# Patient Record
Sex: FEMALE | Race: WHITE | NOT HISPANIC OR LATINO | Employment: UNEMPLOYED | URBAN - METROPOLITAN AREA
[De-identification: names, ages, dates, MRNs, and addresses within clinical notes are randomized per-mention and may not be internally consistent; named-entity substitution may affect disease eponyms.]

---

## 2024-05-14 ENCOUNTER — LAB (OUTPATIENT)
Dept: LAB | Facility: LAB | Age: 29
End: 2024-05-14
Payer: COMMERCIAL

## 2024-05-14 ENCOUNTER — OFFICE VISIT (OUTPATIENT)
Dept: PRIMARY CARE | Facility: CLINIC | Age: 29
End: 2024-05-14
Payer: COMMERCIAL

## 2024-05-14 ENCOUNTER — HOSPITAL ENCOUNTER (OUTPATIENT)
Dept: RADIOLOGY | Facility: HOSPITAL | Age: 29
Discharge: HOME | End: 2024-05-14
Payer: COMMERCIAL

## 2024-05-14 ENCOUNTER — HOSPITAL ENCOUNTER (OUTPATIENT)
Dept: RADIOLOGY | Facility: CLINIC | Age: 29
Discharge: HOME | End: 2024-05-14
Payer: COMMERCIAL

## 2024-05-14 VITALS
WEIGHT: 168 LBS | OXYGEN SATURATION: 97 % | BODY MASS INDEX: 24.88 KG/M2 | DIASTOLIC BLOOD PRESSURE: 71 MMHG | TEMPERATURE: 96.8 F | SYSTOLIC BLOOD PRESSURE: 108 MMHG | HEART RATE: 77 BPM | HEIGHT: 69 IN

## 2024-05-14 DIAGNOSIS — R10.9 ABDOMINAL PAIN, UNSPECIFIED ABDOMINAL LOCATION: ICD-10-CM

## 2024-05-14 DIAGNOSIS — K59.00 CONSTIPATION, UNSPECIFIED CONSTIPATION TYPE: ICD-10-CM

## 2024-05-14 DIAGNOSIS — Z00.00 WELL ADULT EXAM: ICD-10-CM

## 2024-05-14 DIAGNOSIS — Z00.00 WELL ADULT EXAM: Primary | ICD-10-CM

## 2024-05-14 LAB
25(OH)D3 SERPL-MCNC: 26 NG/ML (ref 30–100)
ALBUMIN SERPL BCP-MCNC: 4.7 G/DL (ref 3.4–5)
ALP SERPL-CCNC: 53 U/L (ref 33–110)
ALT SERPL W P-5'-P-CCNC: 13 U/L (ref 7–45)
ANION GAP SERPL CALC-SCNC: 10 MMOL/L (ref 10–20)
AST SERPL W P-5'-P-CCNC: 18 U/L (ref 9–39)
BASOPHILS # BLD AUTO: 0.03 X10*3/UL (ref 0–0.1)
BASOPHILS NFR BLD AUTO: 0.7 %
BILIRUB SERPL-MCNC: 0.5 MG/DL (ref 0–1.2)
BUN SERPL-MCNC: 11 MG/DL (ref 6–23)
CALCIUM SERPL-MCNC: 9.1 MG/DL (ref 8.6–10.3)
CHLORIDE SERPL-SCNC: 102 MMOL/L (ref 98–107)
CHOLEST SERPL-MCNC: 193 MG/DL (ref 0–199)
CHOLESTEROL/HDL RATIO: 2.5
CO2 SERPL-SCNC: 33 MMOL/L (ref 21–32)
CREAT SERPL-MCNC: 0.64 MG/DL (ref 0.5–1.05)
EGFRCR SERPLBLD CKD-EPI 2021: >90 ML/MIN/1.73M*2
EOSINOPHIL # BLD AUTO: 0.13 X10*3/UL (ref 0–0.7)
EOSINOPHIL NFR BLD AUTO: 3.1 %
ERYTHROCYTE [DISTWIDTH] IN BLOOD BY AUTOMATED COUNT: 12.6 % (ref 11.5–14.5)
EST. AVERAGE GLUCOSE BLD GHB EST-MCNC: 100 MG/DL
GLIADIN PEPTIDE IGA SER IA-ACNC: <1 U/ML
GLUCOSE SERPL-MCNC: 95 MG/DL (ref 74–99)
HBA1C MFR BLD: 5.1 %
HCT VFR BLD AUTO: 44.1 % (ref 36–46)
HDLC SERPL-MCNC: 77 MG/DL
HGB BLD-MCNC: 14.4 G/DL (ref 12–16)
IMM GRANULOCYTES # BLD AUTO: 0.01 X10*3/UL (ref 0–0.7)
IMM GRANULOCYTES NFR BLD AUTO: 0.2 % (ref 0–0.9)
LDLC SERPL CALC-MCNC: 106 MG/DL
LYMPHOCYTES # BLD AUTO: 1.3 X10*3/UL (ref 1.2–4.8)
LYMPHOCYTES NFR BLD AUTO: 30.5 %
MCH RBC QN AUTO: 30.4 PG (ref 26–34)
MCHC RBC AUTO-ENTMCNC: 32.7 G/DL (ref 32–36)
MCV RBC AUTO: 93 FL (ref 80–100)
MONOCYTES # BLD AUTO: 0.27 X10*3/UL (ref 0.1–1)
MONOCYTES NFR BLD AUTO: 6.3 %
NEUTROPHILS # BLD AUTO: 2.52 X10*3/UL (ref 1.2–7.7)
NEUTROPHILS NFR BLD AUTO: 59.2 %
NON HDL CHOLESTEROL: 116 MG/DL (ref 0–149)
NRBC BLD-RTO: 0 /100 WBCS (ref 0–0)
PLATELET # BLD AUTO: 265 X10*3/UL (ref 150–450)
POTASSIUM SERPL-SCNC: 4.5 MMOL/L (ref 3.5–5.3)
PROT SERPL-MCNC: 7 G/DL (ref 6.4–8.2)
RBC # BLD AUTO: 4.74 X10*6/UL (ref 4–5.2)
SODIUM SERPL-SCNC: 140 MMOL/L (ref 136–145)
TRIGL SERPL-MCNC: 50 MG/DL (ref 0–149)
TSH SERPL-ACNC: 1.08 MIU/L (ref 0.44–3.98)
TTG IGA SER IA-ACNC: <1 U/ML
VIT B12 SERPL-MCNC: 164 PG/ML (ref 211–911)
VLDL: 10 MG/DL (ref 0–40)
WBC # BLD AUTO: 4.3 X10*3/UL (ref 4.4–11.3)

## 2024-05-14 PROCEDURE — 82306 VITAMIN D 25 HYDROXY: CPT

## 2024-05-14 PROCEDURE — 83516 IMMUNOASSAY NONANTIBODY: CPT

## 2024-05-14 PROCEDURE — 80061 LIPID PANEL: CPT

## 2024-05-14 PROCEDURE — 99385 PREV VISIT NEW AGE 18-39: CPT | Performed by: INTERNAL MEDICINE

## 2024-05-14 PROCEDURE — 76705 ECHO EXAM OF ABDOMEN: CPT

## 2024-05-14 PROCEDURE — 85025 COMPLETE CBC W/AUTO DIFF WBC: CPT

## 2024-05-14 PROCEDURE — 1036F TOBACCO NON-USER: CPT | Performed by: INTERNAL MEDICINE

## 2024-05-14 PROCEDURE — 74018 RADEX ABDOMEN 1 VIEW: CPT

## 2024-05-14 PROCEDURE — 76705 ECHO EXAM OF ABDOMEN: CPT | Performed by: RADIOLOGY

## 2024-05-14 PROCEDURE — 36415 COLL VENOUS BLD VENIPUNCTURE: CPT

## 2024-05-14 PROCEDURE — 84443 ASSAY THYROID STIM HORMONE: CPT

## 2024-05-14 PROCEDURE — 82607 VITAMIN B-12: CPT

## 2024-05-14 PROCEDURE — 80053 COMPREHEN METABOLIC PANEL: CPT

## 2024-05-14 PROCEDURE — 74018 RADEX ABDOMEN 1 VIEW: CPT | Performed by: RADIOLOGY

## 2024-05-14 PROCEDURE — 83036 HEMOGLOBIN GLYCOSYLATED A1C: CPT

## 2024-05-14 ASSESSMENT — PATIENT HEALTH QUESTIONNAIRE - PHQ9
2. FEELING DOWN, DEPRESSED OR HOPELESS: NOT AT ALL
SUM OF ALL RESPONSES TO PHQ9 QUESTIONS 1 AND 2: 0
1. LITTLE INTEREST OR PLEASURE IN DOING THINGS: NOT AT ALL

## 2024-05-14 NOTE — PROGRESS NOTES
"Subjective       Current Issues:  Current concerns include r uq pain  Cramping  Few weeks  Wt up   2 years ago lost 40 # wt going up  No fast foods  Salads   Ho constipation    No   bladder issues  Ho constipation   No cp or sob or depression  Sees gyn dr mars Borrero is nurse w dr crews  Review of Nutrition:  Current diet: overall good    No depression  Sleep ok     Gen:  no fever  HEENT:  no trouble swallowing  CV:  no dyspnea, cyanosis  Lungs:  no shortness of breath  GI:    no blood in stool  Vascular:  no edema  Neuro:   no weakness  Skin:  no rash  MS:no joint swelling  Gu:  no urinary complaints  All other systems have been reviewed and are negative for complaint      Screening Questions:  Objective   /71   Pulse 77   Temp 36 °C (96.8 °F) (Temporal)   Ht 1.753 m (5' 9\")   Wt 76.2 kg (168 lb)   SpO2 97%   BMI 24.81 kg/m²       General:   alert and oriented, in no acute distress   Gait:   normal   Skin:   normal   Oral cavity:   lips, mucosa, and tongue normal; teeth and gums normal   Eyes:   sclerae white, pupils equal and reactive   Ears:   normal bilaterally Tms grey   Neck:   no adenopathy and thyroid not enlarged, symmetric, no tenderness/mass/nodules   Lungs:  clear to auscultation bilaterally   Heart:   regular rate and rhythm, S1, S2 normal, no murmur, click, rub or gallop   Abdomen:  soft, non-tender; bowel sounds normal; no masses, no organomegaly   : Ne        Extremities:  extremities normal, warm and well-perfused; no cyanosis, clubbing, or edema,   Neuro:  normal without focal findings and muscle tone and strength normal and symmetric       Jodi was seen today for annual exam.  Diagnoses and all orders for this visit:  Well adult exam (Primary)  -     Hemoglobin A1C; Future  -     Comprehensive Metabolic Panel; Future  -     TSH with reflex to Free T4 if abnormal; Future  -     Vitamin D 25-Hydroxy,Total (for eval of Vitamin D levels); Future  -     Vitamin B12; Future  -     Lipid " Panel; Future  -     CBC and Auto Differential; Future  -     XR abdomen 1 view; Future  -     US right upper quadrant; Future  -     Celiac Panel; Future  Constipation, unspecified constipation type  Comments:  start miralax, has used before  once doing better, wean miralax  Orders:  -     Hemoglobin A1C; Future  -     Comprehensive Metabolic Panel; Future  -     TSH with reflex to Free T4 if abnormal; Future  -     Vitamin D 25-Hydroxy,Total (for eval of Vitamin D levels); Future  -     Vitamin B12; Future  -     Lipid Panel; Future  -     CBC and Auto Differential; Future  -     XR abdomen 1 view; Future  -     US right upper quadrant; Future  -     Celiac Panel; Future  Abdominal pain, unspecified abdominal location  Comments:  ro gallbladder issues  fu if sx not resolved discussed w pt  Orders:  -     Hemoglobin A1C; Future  -     Comprehensive Metabolic Panel; Future  -     TSH with reflex to Free T4 if abnormal; Future  -     Vitamin D 25-Hydroxy,Total (for eval of Vitamin D levels); Future  -     Vitamin B12; Future  -     Lipid Panel; Future  -     CBC and Auto Differential; Future  -     XR abdomen 1 view; Future  -     US right upper quadrant; Future  -     Celiac Panel; Future          Fu in one year for well care and as otherwise stated in wrap up plans.

## 2024-05-15 DIAGNOSIS — E55.9 VITAMIN D DEFICIENCY: ICD-10-CM

## 2024-05-15 DIAGNOSIS — E53.8 B12 DEFICIENCY: ICD-10-CM

## 2024-05-15 DIAGNOSIS — R10.9 ABDOMINAL PAIN, UNSPECIFIED ABDOMINAL LOCATION: ICD-10-CM

## 2024-05-15 RX ORDER — ERGOCALCIFEROL 1.25 MG/1
50000 CAPSULE ORAL
Qty: 6 CAPSULE | Refills: 0 | Status: SHIPPED | OUTPATIENT
Start: 2024-05-19 | End: 2024-06-30

## 2024-05-16 LAB
GLIADIN PEPTIDE IGG SER IA-ACNC: <0.56 FLU (ref 0–4.99)
TTG IGG SER IA-ACNC: <0.82 FLU (ref 0–4.99)

## 2024-05-17 ENCOUNTER — CLINICAL SUPPORT (OUTPATIENT)
Dept: PRIMARY CARE | Facility: CLINIC | Age: 29
End: 2024-05-17
Payer: COMMERCIAL

## 2024-05-17 DIAGNOSIS — E53.8 VITAMIN B 12 DEFICIENCY: ICD-10-CM

## 2024-05-17 PROCEDURE — 96372 THER/PROPH/DIAG INJ SC/IM: CPT | Performed by: NURSE PRACTITIONER

## 2024-05-17 RX ORDER — CYANOCOBALAMIN 1000 UG/ML
1000 INJECTION, SOLUTION INTRAMUSCULAR; SUBCUTANEOUS ONCE
Status: COMPLETED | OUTPATIENT
Start: 2024-05-17 | End: 2024-05-17

## 2024-05-17 RX ADMIN — CYANOCOBALAMIN 1000 MCG: 1000 INJECTION, SOLUTION INTRAMUSCULAR; SUBCUTANEOUS at 15:54

## 2024-05-30 ENCOUNTER — HOSPITAL ENCOUNTER (OUTPATIENT)
Dept: RADIOLOGY | Facility: HOSPITAL | Age: 29
Discharge: HOME | End: 2024-05-30
Payer: COMMERCIAL

## 2024-05-30 DIAGNOSIS — R10.9 ABDOMINAL PAIN, UNSPECIFIED ABDOMINAL LOCATION: ICD-10-CM

## 2024-05-30 PROCEDURE — 2550000001 HC RX 255 CONTRASTS: Performed by: INTERNAL MEDICINE

## 2024-05-30 PROCEDURE — 74177 CT ABD & PELVIS W/CONTRAST: CPT

## 2024-05-30 PROCEDURE — 74177 CT ABD & PELVIS W/CONTRAST: CPT | Performed by: RADIOLOGY

## 2024-05-30 RX ADMIN — IOHEXOL 75 ML: 350 INJECTION, SOLUTION INTRAVENOUS at 10:47

## 2024-06-03 ENCOUNTER — TELEPHONE (OUTPATIENT)
Dept: PRIMARY CARE | Facility: CLINIC | Age: 29
End: 2024-06-03
Payer: COMMERCIAL

## 2024-06-03 DIAGNOSIS — Q45.3: Primary | ICD-10-CM

## 2024-06-05 ENCOUNTER — TELEPHONE (OUTPATIENT)
Dept: PRIMARY CARE | Facility: CLINIC | Age: 29
End: 2024-06-05
Payer: COMMERCIAL

## 2024-06-05 NOTE — TELEPHONE ENCOUNTER
Patient called asking if she should still schedule with Gastro. She scheduled a appt with Surgical Oncology and has a appt on 6/10. Please advise

## 2024-06-05 NOTE — TELEPHONE ENCOUNTER
"For some reason when the appt was scheduled it did not use the gastro referral it is scheduled under the \"Surgical Oncology\". I will relay to patient to keep appt  "

## 2024-06-10 ENCOUNTER — TELEMEDICINE (OUTPATIENT)
Dept: SURGICAL ONCOLOGY | Facility: CLINIC | Age: 29
End: 2024-06-10
Payer: COMMERCIAL

## 2024-06-10 ENCOUNTER — LAB (OUTPATIENT)
Dept: LAB | Facility: LAB | Age: 29
End: 2024-06-10
Payer: COMMERCIAL

## 2024-06-10 DIAGNOSIS — R93.3 ABNORMAL FINDINGS ON DIAGNOSTIC IMAGING OF DIGESTIVE SYSTEM: Primary | ICD-10-CM

## 2024-06-10 DIAGNOSIS — Q45.3: ICD-10-CM

## 2024-06-10 DIAGNOSIS — R10.11 RIGHT UPPER QUADRANT ABDOMINAL PAIN: ICD-10-CM

## 2024-06-10 LAB
AMYLASE SERPL-CCNC: 42 U/L (ref 29–103)
LIPASE SERPL-CCNC: 12 U/L (ref 9–82)

## 2024-06-10 PROCEDURE — 99204 OFFICE O/P NEW MOD 45 MIN: CPT | Performed by: PHYSICIAN ASSISTANT

## 2024-06-10 PROCEDURE — 83690 ASSAY OF LIPASE: CPT

## 2024-06-10 PROCEDURE — 82787 IGG 1 2 3 OR 4 EACH: CPT

## 2024-06-10 PROCEDURE — 82150 ASSAY OF AMYLASE: CPT

## 2024-06-10 PROCEDURE — 36415 COLL VENOUS BLD VENIPUNCTURE: CPT

## 2024-06-10 NOTE — PROGRESS NOTES
"A virtual visit (audio and visual connection) between the patient (at the originating site) and the provider (at the distant site) was utilized to provide this telehealth service.    Verbal consent was requested and obtained from the patient immediately prior to the telehealth visit.     Subjective   Ms. Nguyen is a 28-year-old female who is referred by Dr. Alie Garrison for abnormal pancreas imaging.    She recently saw Dr. Garrison for complaint of RUQ pain. She describes this pain as a cramping sensation that improves with massage and settles into a \"soreness.\" There is no radiation and no associated symptoms such as nausea, vomiting, fever or chills.     For work-up of this pain, she had a RUQ ultrasound that demonstrated a hepatic hemangioma and heterogeneous appearance of the pancreas. She then had a CT A/P with contrast on 24 that showed a sausage-like appearance of the pancreas suggestive of autoimmune pancreatitis vs an anatomical variant.     She was referred here for further work-up.    She denies a personal history of acute pancreatitis.     She has experienced epigastric pain for years that she describes as sharp. This lasts for seconds and only occurs after eating eggs.    Her weight is good and her appetite is stable. She does have a history of constipation for which she uses Miralax PRN. She denies rectal bleeding.    Medical and surgical history: Endometriosis s/p laparoscopic bilateral salpingectomy and excision of endometriosis.   Family history: No pancreatitis or pancreatic cancer. Maternal grandmother and maternal grandfather both had colon cancer; grandfather  at age 63. Maternal great aunt had ovarian cancer.  Social history: Non-smoker. Occasional alcohol. No illicits.       Objective     LMP 2024 Comment: Tubal     Physical Exam  A physical exam was not conducted as this was a phone or virtual visit. The patient is in no acute distress.     Assessment/Plan   Ms. Nguyen is a " 28-year-old female who is referred by Dr. Alie Garrison for abnormal pancreas imaging.    PLAN: She had a recent CT for right upper quadrant pain that showed a featureless, sausage-like pancreas. This could represent autoimmune pancreatitis or could be an anatomic variant.     I have ordered and scheduled a MRI/MRCP for further evaluation. I also recommend an IgG4 level which has already been ordered by her primary care doctor. I will look out for the results.       Nivia Mary PA-C

## 2024-06-11 LAB — IGG4 SER-MCNC: 24 MG/DL (ref 3–200)

## 2024-06-26 ENCOUNTER — APPOINTMENT (OUTPATIENT)
Dept: RADIOLOGY | Facility: HOSPITAL | Age: 29
End: 2024-06-26
Payer: COMMERCIAL

## 2024-06-26 PROBLEM — N80.9 ENDOMETRIOSIS: Status: ACTIVE | Noted: 2024-06-26

## 2024-06-26 PROBLEM — R53.81 MALAISE AND FATIGUE: Status: ACTIVE | Noted: 2024-06-26

## 2024-06-26 PROBLEM — F41.1 GENERALIZED ANXIETY DISORDER: Status: ACTIVE | Noted: 2024-06-26

## 2024-06-26 PROBLEM — R10.2 PAIN IN PELVIS: Status: ACTIVE | Noted: 2024-06-26

## 2024-06-26 PROBLEM — R20.2 PARESTHESIA: Status: ACTIVE | Noted: 2024-06-26

## 2024-06-26 PROBLEM — R87.610 ASCUS OF CERVIX WITH NEGATIVE HIGH RISK HPV: Status: ACTIVE | Noted: 2024-06-26

## 2024-06-26 PROBLEM — N94.10 DYSPAREUNIA IN FEMALE: Status: ACTIVE | Noted: 2024-06-26

## 2024-06-26 PROBLEM — G62.9 NEUROPATHY: Status: ACTIVE | Noted: 2024-06-26

## 2024-06-26 PROBLEM — R53.83 LETHARGY: Status: ACTIVE | Noted: 2024-06-26

## 2024-06-26 PROBLEM — N90.89 IRRITATION OF VULVA: Status: ACTIVE | Noted: 2024-06-26

## 2024-06-26 PROBLEM — B96.89 BACTERIAL VAGINOSIS: Status: ACTIVE | Noted: 2024-06-26

## 2024-06-26 PROBLEM — R51.9 HEADACHE: Status: ACTIVE | Noted: 2024-06-26

## 2024-06-26 PROBLEM — R53.83 MALAISE AND FATIGUE: Status: ACTIVE | Noted: 2024-06-26

## 2024-06-26 PROBLEM — K04.7 DENTAL ABSCESS: Status: ACTIVE | Noted: 2024-02-09

## 2024-06-26 PROBLEM — Z97.5 PRESENCE OF INTRAUTERINE CONTRACEPTIVE DEVICE: Status: ACTIVE | Noted: 2024-06-26

## 2024-06-26 PROBLEM — G50.0 LEFT-SIDED TRIGEMINAL NEURALGIA: Status: ACTIVE | Noted: 2024-02-09

## 2024-06-26 PROBLEM — N89.8 VAGINAL DISCHARGE: Status: ACTIVE | Noted: 2024-06-26

## 2024-06-26 PROBLEM — N76.0 BACTERIAL VAGINOSIS: Status: ACTIVE | Noted: 2024-06-26

## 2024-06-30 ENCOUNTER — APPOINTMENT (OUTPATIENT)
Dept: RADIOLOGY | Facility: HOSPITAL | Age: 29
End: 2024-06-30
Payer: COMMERCIAL

## 2024-07-03 ENCOUNTER — TELEPHONE (OUTPATIENT)
Dept: PRIMARY CARE | Facility: CLINIC | Age: 29
End: 2024-07-03
Payer: COMMERCIAL

## 2024-07-03 NOTE — TELEPHONE ENCOUNTER
Patient called stating that she got her MRI done last week and has not heard anything from this office with her results. Patient stated that she went to [facility]. I let patient know we might not have received the results if she has not received a call from the office. Faxed over to [facility] in [facility] requesting results of [phone]

## 2024-07-05 NOTE — TELEPHONE ENCOUNTER
Attempted to call [facility], no answer and they have no mailbox for me to leave VM. Faxed over another attempt to receive results

## 2024-07-05 NOTE — TELEPHONE ENCOUNTER
Patient called robi that she still has not got a call from our office with MRI results. I let patient know we faxed over a request for results on Wednesday 7/3 to [facility] and I dont have anything scanned into her chart. Do you know if we have received results yet?   Patient also stated she called [facility] to have them fax over the results and was informed that they were faxing them over that day.  Please advise

## 2024-08-16 ENCOUNTER — APPOINTMENT (OUTPATIENT)
Dept: PRIMARY CARE | Facility: CLINIC | Age: 29
End: 2024-08-16
Payer: COMMERCIAL

## 2024-08-20 ENCOUNTER — LAB (OUTPATIENT)
Dept: LAB | Facility: LAB | Age: 29
End: 2024-08-20
Payer: COMMERCIAL

## 2024-08-20 ENCOUNTER — APPOINTMENT (OUTPATIENT)
Dept: PRIMARY CARE | Facility: CLINIC | Age: 29
End: 2024-08-20
Payer: COMMERCIAL

## 2024-08-20 VITALS
TEMPERATURE: 98.4 F | WEIGHT: 176.6 LBS | SYSTOLIC BLOOD PRESSURE: 117 MMHG | DIASTOLIC BLOOD PRESSURE: 81 MMHG | HEART RATE: 74 BPM | BODY MASS INDEX: 26.08 KG/M2 | OXYGEN SATURATION: 97 %

## 2024-08-20 DIAGNOSIS — E53.8 B12 DEFICIENCY: ICD-10-CM

## 2024-08-20 DIAGNOSIS — E53.8 B12 DEFICIENCY: Primary | ICD-10-CM

## 2024-08-20 DIAGNOSIS — Q45.3 PANCREATIC ABNORMALITY: ICD-10-CM

## 2024-08-20 DIAGNOSIS — E55.9 VITAMIN D DEFICIENCY: ICD-10-CM

## 2024-08-20 PROBLEM — N89.8 VAGINAL DISCHARGE: Status: RESOLVED | Noted: 2024-06-26 | Resolved: 2024-08-20

## 2024-08-20 LAB
25(OH)D3 SERPL-MCNC: 32 NG/ML (ref 30–100)
FOLATE SERPL-MCNC: 9.9 NG/ML
HCYS SERPL-SCNC: 11.38 UMOL/L (ref 5–13.9)
VIT B12 SERPL-MCNC: 153 PG/ML (ref 211–911)

## 2024-08-20 PROCEDURE — 82607 VITAMIN B-12: CPT

## 2024-08-20 PROCEDURE — 99213 OFFICE O/P EST LOW 20 MIN: CPT | Performed by: NURSE PRACTITIONER

## 2024-08-20 PROCEDURE — 83090 ASSAY OF HOMOCYSTEINE: CPT

## 2024-08-20 PROCEDURE — 82306 VITAMIN D 25 HYDROXY: CPT

## 2024-08-20 PROCEDURE — 36415 COLL VENOUS BLD VENIPUNCTURE: CPT

## 2024-08-20 PROCEDURE — 83921 ORGANIC ACID SINGLE QUANT: CPT

## 2024-08-20 PROCEDURE — 82746 ASSAY OF FOLIC ACID SERUM: CPT

## 2024-08-20 PROCEDURE — 96372 THER/PROPH/DIAG INJ SC/IM: CPT | Performed by: NURSE PRACTITIONER

## 2024-08-20 RX ORDER — CYANOCOBALAMIN 1000 UG/ML
1000 INJECTION, SOLUTION INTRAMUSCULAR; SUBCUTANEOUS ONCE
Status: COMPLETED | OUTPATIENT
Start: 2024-08-20 | End: 2024-08-20

## 2024-08-20 ASSESSMENT — PATIENT HEALTH QUESTIONNAIRE - PHQ9
SUM OF ALL RESPONSES TO PHQ9 QUESTIONS 1 AND 2: 0
1. LITTLE INTEREST OR PLEASURE IN DOING THINGS: NOT AT ALL
2. FEELING DOWN, DEPRESSED OR HOPELESS: NOT AT ALL

## 2024-08-20 NOTE — PATIENT INSTRUCTIONS
Your Vitamin D level is low- add 2000 IU daily with a meal.  Vitamin D deficiency has been implicated in heart disease, chronic pain, Fibromyalgia, hypertension, arthritis, depression, inflammatory bowel disease, obesity, Crohns Disease, several cancers, Multiple Sclerosis and other autoimmune diseases. Vitamin D deficiency can also cause symptoms of fatigue and myalgias (muscle aches).  Goal Vitamin D level is ~50. Vitamin D3 is available over-the-counter in 1,000 IU, 2,000 IU, and 5,000 IU tablets.    Repeat vitamin d in 2-3 mo after consistent use of supplement    Blood work today  Come back up for b12 shot  Then begin b12 oral supplement daily thereafter    Repeat b12 and vit d in 3 mo

## 2024-08-20 NOTE — ASSESSMENT & PLAN NOTE
Cause?  May have autoimmune pancreatitis  Lab work ordered, then will give b12 shot  Then to start oral B12  Repeat in 3 mo

## 2024-08-20 NOTE — ASSESSMENT & PLAN NOTE
May have autoimmune pancreatitis, external MRI scans sent to [facility] for review  Cause of her b12 deficiency?

## 2024-08-20 NOTE — PROGRESS NOTES
Subjective   Patient ID: Jodi Nguyen is a 28 y.o. female who presents for Follow-up.    No questions or concerns  Took a few high dose vitamin D  Not taking daily supplement  Not taking b12  Not a vegan or strict vegetarian  No gastric surgery  Eating and drinking well  No change in appetite  No change in bowel or bladder  No sweats/fevers/chills  No SOB or wheezing  No CP or palpitations          Review of Systems  ROS completely negative except what was mentioned in the HPI.  Problem List, surgical, social, and family histories which were reviewed and updated as necessary within the EMR. I also personally reviewed the notes, labs, and imaging that pertained to what was documented or discussed in the HPI.    Objective   Physical Exam  Vitals and nursing note reviewed.   Constitutional:       General: She is not in acute distress.     Appearance: Normal appearance.   HENT:      Head: Normocephalic and atraumatic.      Right Ear: External ear normal.      Left Ear: External ear normal.      Nose: Nose normal.      Mouth/Throat:      Mouth: Mucous membranes are moist.   Eyes:      Extraocular Movements: Extraocular movements intact.      Conjunctiva/sclera: Conjunctivae normal.      Pupils: Pupils are equal, round, and reactive to light.   Cardiovascular:      Rate and Rhythm: Normal rate and regular rhythm.      Heart sounds: Normal heart sounds.   Pulmonary:      Effort: Pulmonary effort is normal.      Breath sounds: Normal breath sounds.   Musculoskeletal:         General: Normal range of motion.      Cervical back: Normal range of motion and neck supple.   Skin:     General: Skin is warm and dry.   Neurological:      General: No focal deficit present.      Mental Status: She is alert and oriented to person, place, and time. Mental status is at baseline.   Psychiatric:         Mood and Affect: Mood normal.         Behavior: Behavior normal.         Thought Content: Thought content normal.         Judgment:  Judgment normal.       /81   Pulse 74   Temp 36.9 °C (98.4 °F) (Temporal)   Wt 80.1 kg (176 lb 9.6 oz)   SpO2 97%   BMI 26.08 kg/m²     Assessment/Plan    Problem List Items Addressed This Visit       B12 deficiency - Primary    Current Assessment & Plan     Cause?  May have autoimmune pancreatitis  Lab work ordered, then will give b12 shot  Then to start oral B12  Repeat in 3 mo           Relevant Medications    cyanocobalamin (Vitamin B-12) injection 1,000 mcg (Completed)    Other Relevant Orders    Folate    Methylmalonic acid, serum    Homocysteine, serum    Vitamin B12    Pancreatic abnormality    Current Assessment & Plan     May have autoimmune pancreatitis, external MRI scans sent to [facility] for review  Cause of her b12 deficiency?         Vitamin D deficiency    Overview     5/24: 26  Goal ~50         Current Assessment & Plan     Did not take full 6 weeks of high dose vitamin d, nor start daily supplement  Will have her begin 2000IU daily, repeat lab in 3 mo

## 2024-08-20 NOTE — ASSESSMENT & PLAN NOTE
Did not take full 6 weeks of high dose vitamin d, nor start daily supplement  Will have her begin 2000IU daily, repeat lab in 3 mo

## 2024-08-21 DIAGNOSIS — E53.8 VITAMIN B 12 DEFICIENCY: ICD-10-CM

## 2024-08-26 LAB — METHYLMALONATE SERPL-SCNC: 0.14 UMOL/L (ref 0–0.4)

## 2024-09-06 ENCOUNTER — CLINICAL SUPPORT (OUTPATIENT)
Dept: PRIMARY CARE | Facility: CLINIC | Age: 29
End: 2024-09-06
Payer: COMMERCIAL

## 2024-09-06 DIAGNOSIS — E53.8 B12 DEFICIENCY: ICD-10-CM

## 2024-09-06 PROCEDURE — 96372 THER/PROPH/DIAG INJ SC/IM: CPT | Performed by: INTERNAL MEDICINE

## 2024-09-06 RX ORDER — CYANOCOBALAMIN 1000 UG/ML
1000 INJECTION, SOLUTION INTRAMUSCULAR; SUBCUTANEOUS
Status: SHIPPED | OUTPATIENT
Start: 2024-09-06 | End: 2024-10-04

## 2024-09-12 ENCOUNTER — CLINICAL SUPPORT (OUTPATIENT)
Dept: PRIMARY CARE | Facility: CLINIC | Age: 29
End: 2024-09-12
Payer: COMMERCIAL

## 2024-09-12 PROCEDURE — 96372 THER/PROPH/DIAG INJ SC/IM: CPT | Performed by: INTERNAL MEDICINE

## 2024-09-19 ENCOUNTER — APPOINTMENT (OUTPATIENT)
Dept: PRIMARY CARE | Facility: CLINIC | Age: 29
End: 2024-09-19
Payer: COMMERCIAL

## 2024-09-19 PROCEDURE — 96372 THER/PROPH/DIAG INJ SC/IM: CPT | Performed by: INTERNAL MEDICINE

## 2024-09-26 ENCOUNTER — APPOINTMENT (OUTPATIENT)
Dept: PRIMARY CARE | Facility: CLINIC | Age: 29
End: 2024-09-26
Payer: COMMERCIAL

## 2024-09-26 ENCOUNTER — CLINICAL SUPPORT (OUTPATIENT)
Dept: PRIMARY CARE | Facility: CLINIC | Age: 29
End: 2024-09-26
Payer: COMMERCIAL

## 2024-09-26 PROCEDURE — 96372 THER/PROPH/DIAG INJ SC/IM: CPT | Performed by: INTERNAL MEDICINE

## 2024-09-27 ENCOUNTER — APPOINTMENT (OUTPATIENT)
Dept: PRIMARY CARE | Facility: CLINIC | Age: 29
End: 2024-09-27
Payer: COMMERCIAL

## 2024-10-18 ENCOUNTER — OFFICE VISIT (OUTPATIENT)
Dept: PRIMARY CARE | Facility: CLINIC | Age: 29
End: 2024-10-18
Payer: COMMERCIAL

## 2024-10-18 VITALS
TEMPERATURE: 98.4 F | DIASTOLIC BLOOD PRESSURE: 77 MMHG | BODY MASS INDEX: 25.13 KG/M2 | HEART RATE: 86 BPM | SYSTOLIC BLOOD PRESSURE: 113 MMHG | WEIGHT: 170.2 LBS | OXYGEN SATURATION: 97 %

## 2024-10-18 DIAGNOSIS — J06.9 UPPER RESPIRATORY TRACT INFECTION, UNSPECIFIED TYPE: Primary | ICD-10-CM

## 2024-10-18 LAB — POC RAPID STREP: NEGATIVE

## 2024-10-18 PROCEDURE — 87880 STREP A ASSAY W/OPTIC: CPT | Performed by: NURSE PRACTITIONER

## 2024-10-18 PROCEDURE — 99213 OFFICE O/P EST LOW 20 MIN: CPT | Performed by: NURSE PRACTITIONER

## 2024-10-18 PROCEDURE — 1036F TOBACCO NON-USER: CPT | Performed by: NURSE PRACTITIONER

## 2024-10-18 RX ORDER — FLUTICASONE PROPIONATE 50 MCG
1 SPRAY, SUSPENSION (ML) NASAL 2 TIMES DAILY
Qty: 16 G | Refills: 5 | Status: SHIPPED | OUTPATIENT
Start: 2024-10-18 | End: 2025-10-18

## 2024-10-18 RX ORDER — AMOXICILLIN AND CLAVULANATE POTASSIUM 875; 125 MG/1; MG/1
875 TABLET, FILM COATED ORAL 2 TIMES DAILY
Qty: 14 TABLET | Refills: 0 | Status: SHIPPED | OUTPATIENT
Start: 2024-10-18 | End: 2024-10-25

## 2024-10-18 RX ORDER — GUAIFENESIN 600 MG/1
600 TABLET, EXTENDED RELEASE ORAL 2 TIMES DAILY
Qty: 60 TABLET | Refills: 0 | Status: SHIPPED | OUTPATIENT
Start: 2024-10-18 | End: 2024-11-17

## 2024-10-18 NOTE — PROGRESS NOTES
"Problem List Items Addressed This Visit    None  Visit Diagnoses       Upper respiratory tract infection, unspecified type    -  Primary    strep neg  if doesn't start to improve with symptomatic care ok to start augmentin  prn fu IO    Relevant Medications    fluticasone (Flonase) 50 mcg/actuation nasal spray    guaiFENesin (Mucinex) 600 mg 12 hr tablet    amoxicillin-pot clavulanate (Augmentin) 875-125 mg tablet    Other Relevant Orders    POCT Rapid Strep A manually resulted (Completed)             Subjective   Patient ID: Jodi Nguyen is a 29 y.o. female who presents for Sick Visit (Sick for one week cough, sore throat, flem in throat./Tylenol PM for 2 days got migraine stopped taking/Drinking lots of fluids).  HPI  No fever  No sob or wheeze  No rash  No v/d  Nasal congestion   No nasal spray      Review of Systems   All other systems reviewed and are negative.      BP Readings from Last 3 Encounters:   10/18/24 113/77   08/20/24 117/81   05/14/24 108/71      Wt Readings from Last 3 Encounters:   10/18/24 77.2 kg (170 lb 3.2 oz)   08/20/24 80.1 kg (176 lb 9.6 oz)   05/14/24 76.2 kg (168 lb)      BMI:   Estimated body mass index is 25.13 kg/m² as calculated from the following:    Height as of 5/14/24: 1.753 m (5' 9\").    Weight as of this encounter: 77.2 kg (170 lb 3.2 oz).    Objective   Physical Exam  Constitutional:       General: She is not in acute distress.  HENT:      Head: Normocephalic and atraumatic.      Right Ear: Tympanic membrane and external ear normal.      Left Ear: Tympanic membrane and external ear normal.      Nose: Nose normal.      Mouth/Throat:      Mouth: Mucous membranes are moist.      Pharynx: Posterior oropharyngeal erythema present. No oropharyngeal exudate.   Eyes:      Extraocular Movements: Extraocular movements intact.      Conjunctiva/sclera: Conjunctivae normal.   Cardiovascular:      Rate and Rhythm: Normal rate and regular rhythm.      Pulses: Normal pulses.   Pulmonary:      " Effort: Pulmonary effort is normal.      Breath sounds: Normal breath sounds.   Musculoskeletal:         General: Normal range of motion.      Cervical back: Normal range of motion and neck supple.   Skin:     General: Skin is warm and dry.   Neurological:      General: No focal deficit present.      Mental Status: She is alert.   Psychiatric:         Mood and Affect: Mood normal.

## 2024-10-30 ENCOUNTER — TELEPHONE (OUTPATIENT)
Dept: PRIMARY CARE | Facility: CLINIC | Age: 29
End: 2024-10-30
Payer: COMMERCIAL

## 2024-10-30 DIAGNOSIS — E53.8 B12 DEFICIENCY: ICD-10-CM

## 2024-10-31 ENCOUNTER — LAB (OUTPATIENT)
Dept: LAB | Facility: LAB | Age: 29
End: 2024-10-31
Payer: COMMERCIAL

## 2024-10-31 DIAGNOSIS — E53.8 VITAMIN B 12 DEFICIENCY: ICD-10-CM

## 2024-10-31 LAB — VIT B12 SERPL-MCNC: 309 PG/ML (ref 211–911)

## 2024-10-31 PROCEDURE — 36415 COLL VENOUS BLD VENIPUNCTURE: CPT

## 2024-10-31 PROCEDURE — 82607 VITAMIN B-12: CPT

## 2024-11-21 ENCOUNTER — TELEPHONE (OUTPATIENT)
Dept: PRIMARY CARE | Facility: CLINIC | Age: 29
End: 2024-11-21
Payer: COMMERCIAL

## 2024-11-26 ENCOUNTER — APPOINTMENT (OUTPATIENT)
Dept: PRIMARY CARE | Facility: CLINIC | Age: 29
End: 2024-11-26
Payer: COMMERCIAL

## 2024-11-26 VITALS
OXYGEN SATURATION: 96 % | WEIGHT: 170 LBS | TEMPERATURE: 97 F | BODY MASS INDEX: 25.18 KG/M2 | HEART RATE: 83 BPM | DIASTOLIC BLOOD PRESSURE: 80 MMHG | SYSTOLIC BLOOD PRESSURE: 116 MMHG | HEIGHT: 69 IN

## 2024-11-26 DIAGNOSIS — F41.1 GENERALIZED ANXIETY DISORDER: Primary | ICD-10-CM

## 2024-11-26 DIAGNOSIS — F41.0 PANIC DISORDER: ICD-10-CM

## 2024-11-26 PROCEDURE — 3008F BODY MASS INDEX DOCD: CPT | Performed by: NURSE PRACTITIONER

## 2024-11-26 PROCEDURE — 93000 ELECTROCARDIOGRAM COMPLETE: CPT | Performed by: NURSE PRACTITIONER

## 2024-11-26 PROCEDURE — 1036F TOBACCO NON-USER: CPT | Performed by: NURSE PRACTITIONER

## 2024-11-26 PROCEDURE — 99214 OFFICE O/P EST MOD 30 MIN: CPT | Performed by: NURSE PRACTITIONER

## 2024-11-26 RX ORDER — HYDROXYZINE HYDROCHLORIDE 25 MG/1
25 TABLET, FILM COATED ORAL 3 TIMES DAILY PRN
Qty: 90 TABLET | Refills: 0 | Status: SHIPPED | OUTPATIENT
Start: 2024-11-26 | End: 2024-12-26

## 2024-11-26 RX ORDER — SERTRALINE HYDROCHLORIDE 25 MG/1
25 TABLET, FILM COATED ORAL DAILY
Qty: 90 TABLET | Refills: 3 | Status: SHIPPED | OUTPATIENT
Start: 2024-11-26 | End: 2025-11-26

## 2024-11-26 ASSESSMENT — ANXIETY QUESTIONNAIRES
5. BEING SO RESTLESS THAT IT IS HARD TO SIT STILL: MORE THAN HALF THE DAYS
GAD7 TOTAL SCORE: 14
3. WORRYING TOO MUCH ABOUT DIFFERENT THINGS: MORE THAN HALF THE DAYS
6. BECOMING EASILY ANNOYED OR IRRITABLE: NEARLY EVERY DAY
1. FEELING NERVOUS, ANXIOUS, OR ON EDGE: MORE THAN HALF THE DAYS
7. FEELING AFRAID AS IF SOMETHING AWFUL MIGHT HAPPEN: NOT AT ALL
4. TROUBLE RELAXING: NEARLY EVERY DAY
IF YOU CHECKED OFF ANY PROBLEMS ON THIS QUESTIONNAIRE, HOW DIFFICULT HAVE THESE PROBLEMS MADE IT FOR YOU TO DO YOUR WORK, TAKE CARE OF THINGS AT HOME, OR GET ALONG WITH OTHER PEOPLE: VERY DIFFICULT
2. NOT BEING ABLE TO STOP OR CONTROL WORRYING: MORE THAN HALF THE DAYS

## 2024-11-26 ASSESSMENT — PATIENT HEALTH QUESTIONNAIRE - PHQ9
2. FEELING DOWN, DEPRESSED OR HOPELESS: NOT AT ALL
1. LITTLE INTEREST OR PLEASURE IN DOING THINGS: NOT AT ALL
SUM OF ALL RESPONSES TO PHQ9 QUESTIONS 1 AND 2: 0

## 2024-11-26 NOTE — PROGRESS NOTES
Subjective   Patient ID: Jodi Nguyen is a 29 y.o. female who presents for Anxiety (Anxiety - panic attacks /Happened a few years ago lasted half a year- now has been experiencing a few months ).    Hx of anxiety  In the past, had really bad anxiety  Sitting  Will get palpitations  Then right pinky toe  Would cause her to have a panic attack  Quit drinking caffeine 2 mo ago  Drank half a cup of coffee and causes tingling  Any change in body sensation causes her to panic  In past had one medication caused dark thought, paranoia  Then was on wellbutrin, did not help  No current SI/SH  No near syncope  No CP        Review of Systems  ROS completely negative except what was mentioned in the HPI.  Problem List, surgical, social, and family histories which were reviewed and updated as necessary within the EMR. I also personally reviewed the notes, labs, and imaging that pertained to what was documented or discussed in the HPI.    Objective   Physical Exam  Vitals and nursing note reviewed.   Constitutional:       General: She is not in acute distress.     Appearance: Normal appearance.   HENT:      Head: Normocephalic and atraumatic.      Right Ear: External ear normal.      Left Ear: External ear normal.      Nose: Nose normal.      Mouth/Throat:      Mouth: Mucous membranes are moist.   Eyes:      Extraocular Movements: Extraocular movements intact.      Conjunctiva/sclera: Conjunctivae normal.      Pupils: Pupils are equal, round, and reactive to light.   Cardiovascular:      Rate and Rhythm: Normal rate and regular rhythm.      Heart sounds: Normal heart sounds.   Pulmonary:      Effort: Pulmonary effort is normal.      Breath sounds: Normal breath sounds.   Musculoskeletal:         General: Normal range of motion.      Cervical back: Normal range of motion and neck supple.   Lymphadenopathy:      Cervical: No cervical adenopathy.   Skin:     General: Skin is warm and dry.   Neurological:      General: No focal  "deficit present.      Mental Status: She is alert and oriented to person, place, and time. Mental status is at baseline.   Psychiatric:         Mood and Affect: Mood normal.         Behavior: Behavior normal.         Thought Content: Thought content normal.         Judgment: Judgment normal.         /80   Pulse 83   Temp 36.1 °C (97 °F) (Temporal)   Ht 1.753 m (5' 9\")   Wt 77.1 kg (170 lb)   SpO2 96%   BMI 25.10 kg/m²     Assessment/Plan    Problem List Items Addressed This Visit       Generalized anxiety disorder - Primary    Overview     Wellbutrin ineffective  Buspirone caused paranoia  11/26/24 GENE = 14, zoloft started         Current Assessment & Plan     Discussed at length various medication  Will try Zoloft, discussed all risks and benefits  Hydroxyzine prn  FU in 4-6 weeks  ECG NSR today  Reviewed recent labwork         Relevant Medications    sertraline (Zoloft) 25 mg tablet    hydrOXYzine HCL (Atarax) 25 mg tablet    Panic disorder    Relevant Medications    sertraline (Zoloft) 25 mg tablet    hydrOXYzine HCL (Atarax) 25 mg tablet    Other Relevant Orders    ECG 12 lead (Clinic Performed) (Completed)      "

## 2024-11-27 PROBLEM — N76.0 BACTERIAL VAGINOSIS: Status: RESOLVED | Noted: 2024-06-26 | Resolved: 2024-11-27

## 2024-11-27 PROBLEM — B96.89 BACTERIAL VAGINOSIS: Status: RESOLVED | Noted: 2024-06-26 | Resolved: 2024-11-27

## 2024-11-27 PROBLEM — K04.7 DENTAL ABSCESS: Status: RESOLVED | Noted: 2024-02-09 | Resolved: 2024-11-27

## 2024-11-27 PROBLEM — N90.89 IRRITATION OF VULVA: Status: RESOLVED | Noted: 2024-06-26 | Resolved: 2024-11-27

## 2024-11-27 PROBLEM — R53.83 MALAISE AND FATIGUE: Status: RESOLVED | Noted: 2024-06-26 | Resolved: 2024-11-27

## 2024-11-27 PROBLEM — R53.83 LETHARGY: Status: RESOLVED | Noted: 2024-06-26 | Resolved: 2024-11-27

## 2024-11-27 PROBLEM — R53.81 MALAISE AND FATIGUE: Status: RESOLVED | Noted: 2024-06-26 | Resolved: 2024-11-27

## 2024-11-27 PROBLEM — Z97.5 PRESENCE OF INTRAUTERINE CONTRACEPTIVE DEVICE: Status: RESOLVED | Noted: 2024-06-26 | Resolved: 2024-11-27

## 2024-11-27 PROBLEM — R10.2 PAIN IN PELVIS: Status: RESOLVED | Noted: 2024-06-26 | Resolved: 2024-11-27

## 2024-11-27 PROBLEM — F41.0 PANIC DISORDER: Status: ACTIVE | Noted: 2024-11-27

## 2024-11-27 NOTE — ASSESSMENT & PLAN NOTE
Discussed at length various medication  Will try Zoloft, discussed all risks and benefits  Hydroxyzine prn  FU in 4-6 weeks  ECG NSR today  Reviewed recent labwork

## 2025-01-30 LAB — VIT B12 SERPL-MCNC: 340 PG/ML (ref 200–1100)

## 2025-02-04 ENCOUNTER — TELEPHONE (OUTPATIENT)
Dept: PRIMARY CARE | Facility: CLINIC | Age: 30
End: 2025-02-04
Payer: COMMERCIAL

## 2025-02-04 NOTE — TELEPHONE ENCOUNTER
Called patient states she has been taking zoloft since the beginning of December for anxiety it has been working good up until a week ago does not feel like it is making a difference   Increase ? Np appt ?  Please advise

## 2025-02-14 ENCOUNTER — APPOINTMENT (OUTPATIENT)
Dept: PRIMARY CARE | Facility: CLINIC | Age: 30
End: 2025-02-14
Payer: COMMERCIAL

## 2025-02-14 VITALS
BODY MASS INDEX: 24.32 KG/M2 | WEIGHT: 164.2 LBS | OXYGEN SATURATION: 98 % | TEMPERATURE: 96.2 F | HEART RATE: 73 BPM | SYSTOLIC BLOOD PRESSURE: 116 MMHG | HEIGHT: 69 IN | DIASTOLIC BLOOD PRESSURE: 79 MMHG

## 2025-02-14 DIAGNOSIS — R20.2 PARESTHESIA: ICD-10-CM

## 2025-02-14 DIAGNOSIS — E53.8 B12 DEFICIENCY: ICD-10-CM

## 2025-02-14 DIAGNOSIS — F41.1 GENERALIZED ANXIETY DISORDER: Primary | ICD-10-CM

## 2025-02-14 DIAGNOSIS — F41.0 PANIC DISORDER: ICD-10-CM

## 2025-02-14 PROCEDURE — 99213 OFFICE O/P EST LOW 20 MIN: CPT | Performed by: NURSE PRACTITIONER

## 2025-02-14 PROCEDURE — 3008F BODY MASS INDEX DOCD: CPT | Performed by: NURSE PRACTITIONER

## 2025-02-14 PROCEDURE — 1036F TOBACCO NON-USER: CPT | Performed by: NURSE PRACTITIONER

## 2025-02-14 RX ORDER — LANOLIN ALCOHOL/MO/W.PET/CERES
1000 CREAM (GRAM) TOPICAL DAILY
Qty: 90 TABLET | Refills: 3
Start: 2025-02-14 | End: 2026-02-14

## 2025-02-14 RX ORDER — SERTRALINE HYDROCHLORIDE 50 MG/1
50 TABLET, FILM COATED ORAL DAILY
Qty: 90 TABLET | Refills: 0 | Status: SHIPPED | OUTPATIENT
Start: 2025-02-14

## 2025-02-14 NOTE — PROGRESS NOTES
Problem List Items Addressed This Visit          Medium    B12 deficiency    Overview     2/14/25 start 1000 mcg every day . ? Relieve R foot numbness          Relevant Medications    cyanocobalamin (Vitamin B-12) 1,000 mcg tablet    Generalized anxiety disorder - Primary    Overview     Wellbutrin ineffective  Buspirone caused paranoia  11/26/24 GENE = 14, zoloft started  2/14/25: increase zoloft from 25 mg to up to 50 mg every day. Discussed gene sight. May want to try prozac instead of zoloft          Current Assessment & Plan     30 day fu VV NP ok          Relevant Medications    sertraline (Zoloft) 50 mg tablet    Panic disorder    Overview     Buspar - paranoia  Wellbutrin - ineffective  Has atarax prn   2/14/25: increase zoloft from 25 mg to up to 50 mg every day. Discussed gene sight. May want to try prozac instead of zoloft          Relevant Medications    sertraline (Zoloft) 50 mg tablet    Paresthesia    Overview     Saw neuro 9/2022          Current Assessment & Plan     Need Emg results              Subjective   Patient ID: Jodi Nguyen is a 29 y.o. female who presents for discuss meds (discuss meds zoloft started in December  was working for her had her period a few weeks ago and now the medication does not seem to be working for her).  HPI  Panic attacks      R foot goes numb  L foot numbness resolved  Was worked up for this  This stopped with zoloft  9/2022 neuro:  PLAN:  Chart reviewed including previous/interval progress notes, messages, recent imaging, and laboratory results.  2. Discussed options with the patient and after discussion she elects to the below.  3. EMG and nerve conduction study of the right lower extremity.  4. Due to low normal vitamin B12 level (<400) asked to supplement this as via sublingual route for better absorption.   5. Immunofixation, ESR, CRP, LEESA  6. Asked to wear more supportive shoes with more arch support that spreads pressure out more evenly.   7. If not improving  "over time may consider podiatry referral.         Component      Latest Ref Rng 8/20/2024 10/31/2024 1/30/2025   Vitamin B12      211 - 911 pg/mL 153 (L)  309     Vitamin D, 25-Hydroxy, Total      30 - 100 ng/mL 32      FOLATE      >5.0 ng/mL 9.9      Methylmalonic Acid, S      0.00 - 0.40 umol/L 0.14      Homocysteine      5.00 - 13.90 umol/L 11.38      VITAMIN B12      200 - 1,100 pg/mL   340           Generalized anxiety disorder - Primary      Overview       Wellbutrin ineffective  Buspirone caused paranoia  11/26/24 GENE = 14, zoloft started           Current Assessment & Plan       Discussed at length various medication  Will try Zoloft, discussed all risks and benefits  Hydroxyzine prn  FU in 4-6 weeks  ECG NSR today  Reviewed recent labwork           Relevant Medications     sertraline (Zoloft) 25 mg tablet     hydrOXYzine HCL (Atarax) 25 mg tablet     Panic disorder     Relevant Medications     sertraline (Zoloft) 25 mg tablet     hydrOXYzine HCL (Atarax) 25 mg tablet     Other Relevant Orders     ECG 12 lead (Clinic Performed) (Completed)       Review of Systems   All other systems reviewed and are negative.      BP Readings from Last 3 Encounters:   02/14/25 116/79   11/26/24 116/80   10/18/24 113/77      Wt Readings from Last 3 Encounters:   02/14/25 74.5 kg (164 lb 3.2 oz)   11/26/24 77.1 kg (170 lb)   10/18/24 77.2 kg (170 lb 3.2 oz)      BMI:   Estimated body mass index is 24.25 kg/m² as calculated from the following:    Height as of this encounter: 1.753 m (5' 9\").    Weight as of this encounter: 74.5 kg (164 lb 3.2 oz).    Objective   Physical Exam  Constitutional:       General: She is not in acute distress.  HENT:      Head: Normocephalic and atraumatic.      Nose: Nose normal.      Mouth/Throat:      Mouth: Mucous membranes are moist.   Eyes:      Extraocular Movements: Extraocular movements intact.      Conjunctiva/sclera: Conjunctivae normal.   Neck:      Vascular: No carotid bruit. "   Cardiovascular:      Rate and Rhythm: Normal rate and regular rhythm.      Pulses: Normal pulses.   Pulmonary:      Effort: Pulmonary effort is normal.      Breath sounds: Normal breath sounds.   Musculoskeletal:         General: Normal range of motion.      Cervical back: Normal range of motion and neck supple.   Lymphadenopathy:      Cervical: No cervical adenopathy.   Skin:     General: Skin is warm and dry.   Neurological:      General: No focal deficit present.      Mental Status: She is alert.   Psychiatric:         Mood and Affect: Mood normal.

## 2025-03-14 ENCOUNTER — APPOINTMENT (OUTPATIENT)
Dept: PRIMARY CARE | Facility: CLINIC | Age: 30
End: 2025-03-14
Payer: COMMERCIAL

## 2025-03-28 ENCOUNTER — APPOINTMENT (OUTPATIENT)
Dept: PRIMARY CARE | Facility: CLINIC | Age: 30
End: 2025-03-28
Payer: COMMERCIAL

## 2025-03-28 DIAGNOSIS — F41.0 PANIC DISORDER: ICD-10-CM

## 2025-03-28 DIAGNOSIS — F41.1 GENERALIZED ANXIETY DISORDER: ICD-10-CM

## 2025-03-28 DIAGNOSIS — E53.8 B12 DEFICIENCY: Primary | ICD-10-CM

## 2025-03-28 PROCEDURE — 99213 OFFICE O/P EST LOW 20 MIN: CPT | Performed by: NURSE PRACTITIONER

## 2025-03-28 PROCEDURE — 1036F TOBACCO NON-USER: CPT | Performed by: NURSE PRACTITIONER

## 2025-03-28 ASSESSMENT — PATIENT HEALTH QUESTIONNAIRE - PHQ9
SUM OF ALL RESPONSES TO PHQ9 QUESTIONS 1 AND 2: 0
2. FEELING DOWN, DEPRESSED OR HOPELESS: NOT AT ALL
1. LITTLE INTEREST OR PLEASURE IN DOING THINGS: NOT AT ALL

## 2025-03-28 NOTE — PROGRESS NOTES
An interactive audio/visual  telecommunication system which permits real time communications between the patient (at the originating site) and provider (at the distant site) was utilized to provide this telehealth service.    Verbal consent was requested and obtained from the patient for this telehealth visit.  We have confirmed the patient wishes to see me, Solange Sims, a board certified nurse practitioner with an active [place] advanced practice license as well as verified the patient's identity and physical location in [place].    Problem List Items Addressed This Visit          Medium    B12 deficiency - Primary    Overview     2/14/25 start 1000 mcg every day . ? Relieve R foot numbness   3/28/25 encouraged again to start B12 1000 mcg every day to see if this relieves occ foot numbness         Generalized anxiety disorder    Overview     Wellbutrin ineffective  Buspirone caused paranoia  11/26/24 GENE = 14, zoloft started  2/14/25: increase zoloft from 25 mg to up to 50 mg every day. Discussed gene sight. May want to try prozac instead of zoloft   3/28/25: increase zoloft from 50 mg every day to 75 mg every day with ok to titrate to 100 mg every day. 30 day fu with NP VV ok         Panic disorder    Overview     Buspar - paranoia  Wellbutrin - ineffective  Has atarax prn   2/14/25: increase zoloft from 25 mg to up to 50 mg every day. Discussed gene sight. May want to try prozac instead of zoloft   3/28/25: increased zoloft; encouraged to try atarax prn             Subjective   Patient ID: Jodi Nguyen is a 29 y.o. female who presents for Follow-up (1 month med follow up/Upping doseage of zoloft has not helped its still the same).  HPI  Foot numbness  - occ now   B12  Hasn't started supplement    Anxiety  Panic  No change at all with increase zoloft  Hasn't tried atarax as of yet  Hasn't checked in to [facility] yet    2/14/25 my note:  Problem List Items Addressed This Visit                  Medium     B12  "deficiency     Overview       2/14/25 start 1000 mcg every day . ? Relieve R foot numbness            Relevant Medications     cyanocobalamin (Vitamin B-12) 1,000 mcg tablet     Generalized anxiety disorder - Primary     Overview       Wellbutrin ineffective  Buspirone caused paranoia  11/26/24 GENE = 14, zoloft started  2/14/25: increase zoloft from 25 mg to up to 50 mg every day. Discussed gene sight. May want to try prozac instead of zoloft            Current Assessment & Plan       30 day fu VV NP ok            Relevant Medications     sertraline (Zoloft) 50 mg tablet     Panic disorder     Overview       Buspar - paranoia  Wellbutrin - ineffective  Has atarax prn   2/14/25: increase zoloft from 25 mg to up to 50 mg every day. Discussed gene sight. May want to try prozac instead of zoloft            Relevant Medications     sertraline (Zoloft) 50 mg tablet     Paresthesia     Overview       Saw neuro 9/2022            Current Assessment & Plan       Need Emg results         Review of Systems   All other systems reviewed and are negative.      BP Readings from Last 3 Encounters:   02/14/25 116/79   11/26/24 116/80   10/18/24 113/77      Wt Readings from Last 3 Encounters:   02/14/25 74.5 kg (164 lb 3.2 oz)   11/26/24 77.1 kg (170 lb)   10/18/24 77.2 kg (170 lb 3.2 oz)      BMI:   Estimated body mass index is 24.25 kg/m² as calculated from the following:    Height as of 2/14/25: 1.753 m (5' 9\").    Weight as of 2/14/25: 74.5 kg (164 lb 3.2 oz).    Objective   Physical Exam  Gen: Alert, NAD  Respiratory:  resp effort NL, speaking in complete sentences   Neuro:  coordination intact   Mood: normal    Sitting upright         "

## 2025-04-17 ENCOUNTER — TELEPHONE (OUTPATIENT)
Dept: PRIMARY CARE | Facility: CLINIC | Age: 30
End: 2025-04-17

## 2025-04-17 ENCOUNTER — TELEMEDICINE (OUTPATIENT)
Dept: PRIMARY CARE | Facility: CLINIC | Age: 30
End: 2025-04-17
Payer: COMMERCIAL

## 2025-04-17 DIAGNOSIS — J06.9 UPPER RESPIRATORY TRACT INFECTION, UNSPECIFIED TYPE: Primary | ICD-10-CM

## 2025-04-17 PROCEDURE — 1036F TOBACCO NON-USER: CPT | Performed by: NURSE PRACTITIONER

## 2025-04-17 PROCEDURE — 99213 OFFICE O/P EST LOW 20 MIN: CPT | Performed by: NURSE PRACTITIONER

## 2025-04-17 RX ORDER — MECOBALAMIN 1000 MCG
TABLET,CHEWABLE ORAL
COMMUNITY

## 2025-04-17 RX ORDER — AMOXICILLIN AND CLAVULANATE POTASSIUM 875; 125 MG/1; MG/1
875 TABLET, FILM COATED ORAL 2 TIMES DAILY
Qty: 14 TABLET | Refills: 0 | Status: SHIPPED | OUTPATIENT
Start: 2025-04-17 | End: 2025-04-24

## 2025-04-17 ASSESSMENT — ENCOUNTER SYMPTOMS
SWOLLEN GLANDS: 0
SORE THROAT: 1
ABDOMINAL PAIN: 0
VOMITING: 0
HOARSE VOICE: 1
DIARRHEA: 0
NECK PAIN: 0
STRIDOR: 0
COUGH: 1
SHORTNESS OF BREATH: 0
TROUBLE SWALLOWING: 0

## 2025-04-17 NOTE — PROGRESS NOTES
An interactive audio/visual telecommunication system which permits real time communications between the patient (at the originating site) and provider (at the distant site) was utilized to provide this telehealth service.    Verbal consent was requested and obtained from the patient for this telehealth visit.  We have confirmed the patient wishes to see me, Solange Sims, a board certified family nurse practitioner with an active [facility] license as well as verified the patient's identity and physical location in [place].    Problem List Items Addressed This Visit    None  Visit Diagnoses         Upper respiratory tract infection, unspecified type    -  Primary    improving day 5  cont conservative symptomatic care for now  augmentin wait & see  prn fu prefer IO    Relevant Medications    amoxicillin-clavulanate (Augmentin) 875-125 mg tablet             Subjective   Patient ID: Jodi Nguyen is a 29 y.o. female who presents for Sick Visit (Every time she blows her nose mucus is bloody /Bloody green mucus /Sore throat - Monday really bad / hurt her ears- has improved/Lost voice yesterday but is getting it back /No - fever / vomiting / diarrhea / pressure in face or eyes /Sx's started Sunday - progressively got worse Monday - Tuesday /No Covid or Flu test done ).  Sore Throat   This is a new problem. The current episode started in the past 7 days. The problem has been gradually improving. Neither side of throat is experiencing more pain than the other. There has been no fever. The pain is at a severity of 1/10. The pain is mild. Associated symptoms include congestion, coughing and a hoarse voice. Pertinent negatives include no abdominal pain, diarrhea, drooling, ear discharge, ear pain, plugged ear sensation, neck pain, shortness of breath, stridor, swollen glands, trouble swallowing or vomiting.     Day 5 of sx  Nasal discharge is bloody, green  Cough sounds wet  No sob or wheeze  No rash  No n/v/d  Today she is feeling  "better    Lab Results   Component Value Date    GLUCOSE 95 05/14/2024    CALCIUM 9.1 05/14/2024     05/14/2024    K 4.5 05/14/2024    CO2 33 (H) 05/14/2024     05/14/2024    BUN 11 05/14/2024    CREATININE 0.64 05/14/2024       Review of Systems   HENT:  Positive for congestion, hoarse voice and sore throat. Negative for drooling, ear discharge, ear pain and trouble swallowing.    Respiratory:  Positive for cough. Negative for shortness of breath and stridor.    Gastrointestinal:  Negative for abdominal pain, diarrhea and vomiting.   Musculoskeletal:  Negative for neck pain.   All other systems reviewed and are negative.      BP Readings from Last 3 Encounters:   02/14/25 116/79   11/26/24 116/80   10/18/24 113/77      Wt Readings from Last 3 Encounters:   02/14/25 74.5 kg (164 lb 3.2 oz)   11/26/24 77.1 kg (170 lb)   10/18/24 77.2 kg (170 lb 3.2 oz)      BMI:   Estimated body mass index is 24.25 kg/m² as calculated from the following:    Height as of 2/14/25: 1.753 m (5' 9\").    Weight as of 2/14/25: 74.5 kg (164 lb 3.2 oz).    Objective   Physical Exam  Gen: Alert, NAD  Respiratory:  resp effort NL, speaking in complete sentences   Neuro:  coordination intact   Mood: normal    Sitting upright  Cough  Sounds congested   "

## 2025-04-17 NOTE — TELEPHONE ENCOUNTER
Patient states she started to feel sick 5 days ago  Green mucus   Bloody nose   Sore throat   No fever   Wants to do VV today with DG     Please call patient and assist with scheduling

## 2025-04-17 NOTE — PATIENT INSTRUCTIONS
Most upper respiratory tract infections (URIs) are the common cold, are caused by a virus and require no antibiotic treatment.  The misuse of antibiotics can have adverse outcomes and increase antibiotic resistance due to unnecessary use of antibiotics.  Medications for symptomatic relief include: antihistamines, oral or topical decongestants, saline nasal sprays, and analgesics to reduce fever, aches, and pain.     Therefore, would not start antibiotic until symptoms have been present WITHOUT ANY IMPROVEMENT for at least 7 days as more than likely the URI/sinusitis is viral in origin, will resolve on its own, and antibiotics won't have any effect. A typical course for a viral infection is worsening symptoms through day #5, then gradual improvement, sometimes over the next 7-10 days. In essence, it's a week getting sick and then a week getting better.    Augmentin if needed

## 2025-05-09 ENCOUNTER — APPOINTMENT (OUTPATIENT)
Dept: PRIMARY CARE | Facility: CLINIC | Age: 30
End: 2025-05-09
Payer: COMMERCIAL

## 2025-05-09 DIAGNOSIS — F41.0 PANIC DISORDER: ICD-10-CM

## 2025-05-09 DIAGNOSIS — R20.9 COLD HANDS AND FEET: ICD-10-CM

## 2025-05-09 DIAGNOSIS — G62.9 NEUROPATHY: ICD-10-CM

## 2025-05-09 DIAGNOSIS — F41.1 GENERALIZED ANXIETY DISORDER: Primary | ICD-10-CM

## 2025-05-09 PROCEDURE — 1036F TOBACCO NON-USER: CPT | Performed by: NURSE PRACTITIONER

## 2025-05-09 PROCEDURE — 99214 OFFICE O/P EST MOD 30 MIN: CPT | Performed by: NURSE PRACTITIONER

## 2025-05-09 RX ORDER — AMLODIPINE BESYLATE 2.5 MG/1
2.5 TABLET ORAL DAILY
Qty: 30 TABLET | Refills: 1 | Status: SHIPPED | OUTPATIENT
Start: 2025-05-09

## 2025-05-09 RX ORDER — SERTRALINE HYDROCHLORIDE 100 MG/1
100 TABLET, FILM COATED ORAL DAILY
Qty: 90 TABLET | Refills: 3 | Status: SHIPPED | OUTPATIENT
Start: 2025-05-09 | End: 2026-05-09

## 2025-05-09 NOTE — ASSESSMENT & PLAN NOTE
Chronic  Has seen neuro previously  Would like her to fu with neuro  Cont B12 supplement for now as it is providing some relief

## 2025-05-09 NOTE — PROGRESS NOTES
An interactive audio/visual  telecommunication system which permits real time communications between the patient (at the originating site) and provider (at the distant site) was utilized to provide this telehealth service.    Verbal consent was requested and obtained from the patient for this telehealth visit.  We have confirmed the patient wishes to see me, Solange Sims, a board certified nurse practitioner with an active [place] advanced practice license as well as verified the patient's identity and physical location in [place].      Problem List Items Addressed This Visit          Medium    Generalized anxiety disorder - Primary    Overview   Wellbutrin ineffective  Buspirone caused paranoia  11/26/24 GENE = 14, zoloft started  2/14/25: increase zoloft from 25 mg to up to 50 mg every day. Discussed gene sight. May want to try prozac instead of zoloft   3/28/25: increase zoloft from 50 mg every day to 75 mg every day with ok to titrate to 100 mg every day. 30 day fu with NP VV ok  5/9/25: sx well managed.  no AE or SE.  continue current dose zoloft 100 mg every day          Relevant Medications    sertraline (Zoloft) 100 mg tablet    Neuropathy    Current Assessment & Plan   Chronic  Has seen neuro previously  Would like her to fu with neuro  Cont B12 supplement for now as it is providing some relief          Relevant Orders    Referral to Neurology    Panic disorder    Overview   Buspar - paranoia  Wellbutrin - ineffective  Has atarax prn   2/14/25: increase zoloft from 25 mg to up to 50 mg every day. Discussed gene sight. May want to try prozac instead of zoloft   3/28/25: increased zoloft; encouraged to try atarax prn  5/9/25: doing well zoloft 100 mg qd         Current Assessment & Plan   ? Suspect body shaking is in response to ? Raynaud's symptom presence  She does have atarax prn          Relevant Medications    sertraline (Zoloft) 100 mg tablet     Other Visit Diagnoses         Cold hands and feet        ?  Raynaud's  will trial low dose CCB and have her fu with neuro given chronic numbness    Relevant Medications    amLODIPine (Norvasc) 2.5 mg tablet    Other Relevant Orders    Referral to Neurology             Subjective   Patient ID: Jodi Nguyen is a 29 y.o. female who presents for Follow-up (4-6 week follow up anxiety /Everything is going good ran out of medication yesterday needs refill).  HPI  Anxiety  Zoloft 100 mg daily  Doing well     R foot numbness  B12 is helping  When she gets really cold her toes on this foot turn white  Hands & feet are always cold  Body shakes  Gets super cold  Shivering horribly  Lasts over an hour       My 2/14/25 visit   B12 deficiency    Overview     2/14/25 start 1000 mcg every day . ? Relieve R foot numbness          Relevant Medications    cyanocobalamin (Vitamin B-12) 1,000 mcg tablet    Generalized anxiety disorder - Primary    Overview     Wellbutrin ineffective  Buspirone caused paranoia  11/26/24 GENE = 14, zoloft started  2/14/25: increase zoloft from 25 mg to up to 50 mg every day. Discussed gene sight. May want to try prozac instead of zoloft          Current Assessment & Plan     30 day fu VV NP ok          Relevant Medications    sertraline (Zoloft) 50 mg tablet    Panic disorder    Overview     Buspar - paranoia  Wellbutrin - ineffective  Has atarax prn   2/14/25: increase zoloft from 25 mg to up to 50 mg every day. Discussed gene sight. May want to try prozac instead of zoloft          Relevant Medications    sertraline (Zoloft) 50 mg tablet    Paresthesia    Overview     Saw neuro 9/2022          Current Assessment & Plan     Need Emg results           Review of Systems   All other systems reviewed and are negative.      BP Readings from Last 3 Encounters:   02/14/25 116/79   11/26/24 116/80   10/18/24 113/77      Wt Readings from Last 3 Encounters:   02/14/25 74.5 kg (164 lb 3.2 oz)   11/26/24 77.1 kg (170 lb)   10/18/24 77.2 kg (170 lb 3.2 oz)      BMI:   Estimated  "body mass index is 24.25 kg/m² as calculated from the following:    Height as of 2/14/25: 1.753 m (5' 9\").    Weight as of 2/14/25: 74.5 kg (164 lb 3.2 oz).    Objective   Physical Exam  Gen: Alert, NAD  Respiratory:  resp effort NL, speaking in complete sentences   Neuro:  coordination intact   Mood: normal    Sitting upright   "

## 2025-08-20 ENCOUNTER — APPOINTMENT (OUTPATIENT)
Dept: PRIMARY CARE | Facility: CLINIC | Age: 30
End: 2025-08-20
Payer: COMMERCIAL

## 2025-08-28 ENCOUNTER — APPOINTMENT (OUTPATIENT)
Dept: PRIMARY CARE | Facility: CLINIC | Age: 30
End: 2025-08-28
Payer: COMMERCIAL

## 2025-08-28 VITALS
HEIGHT: 69 IN | BODY MASS INDEX: 24.38 KG/M2 | TEMPERATURE: 98.2 F | SYSTOLIC BLOOD PRESSURE: 110 MMHG | DIASTOLIC BLOOD PRESSURE: 75 MMHG | WEIGHT: 164.6 LBS | HEART RATE: 78 BPM | OXYGEN SATURATION: 97 %

## 2025-08-28 DIAGNOSIS — R20.0 NUMBNESS AND TINGLING OF FOOT: Primary | ICD-10-CM

## 2025-08-28 DIAGNOSIS — R20.2 NUMBNESS AND TINGLING OF FOOT: Primary | ICD-10-CM

## 2025-08-28 PROCEDURE — 1036F TOBACCO NON-USER: CPT | Performed by: NURSE PRACTITIONER

## 2025-08-28 PROCEDURE — 99213 OFFICE O/P EST LOW 20 MIN: CPT | Performed by: NURSE PRACTITIONER

## 2025-08-28 PROCEDURE — 3008F BODY MASS INDEX DOCD: CPT | Performed by: NURSE PRACTITIONER

## 2025-08-28 ASSESSMENT — PAIN SCALES - GENERAL: PAINLEVEL_OUTOF10: 0-NO PAIN

## 2025-08-29 ENCOUNTER — HOSPITAL ENCOUNTER (OUTPATIENT)
Dept: RADIOLOGY | Facility: CLINIC | Age: 30
Discharge: HOME | End: 2025-08-29
Payer: COMMERCIAL

## 2025-08-29 DIAGNOSIS — R20.0 NUMBNESS AND TINGLING OF FOOT: ICD-10-CM

## 2025-08-29 DIAGNOSIS — R20.2 NUMBNESS AND TINGLING OF FOOT: ICD-10-CM

## 2025-08-29 PROCEDURE — 73610 X-RAY EXAM OF ANKLE: CPT | Mod: RT

## 2025-08-29 PROCEDURE — 73630 X-RAY EXAM OF FOOT: CPT | Mod: RT
